# Patient Record
Sex: FEMALE | RURAL
[De-identification: names, ages, dates, MRNs, and addresses within clinical notes are randomized per-mention and may not be internally consistent; named-entity substitution may affect disease eponyms.]

---

## 2023-09-05 ENCOUNTER — APPOINTMENT (OUTPATIENT)
Dept: RURAL SURGERY 2 | Age: 48
Setting detail: DERMATOLOGY
End: 2023-09-05

## 2023-09-05 DIAGNOSIS — L73.2 HIDRADENITIS SUPPURATIVA: ICD-10-CM

## 2023-09-05 DIAGNOSIS — D17 BENIGN LIPOMATOUS NEOPLASM: ICD-10-CM

## 2023-09-05 DIAGNOSIS — L71.8 OTHER ROSACEA: ICD-10-CM

## 2023-09-05 PROBLEM — D17.23 BENIGN LIPOMATOUS NEOPLASM OF SKIN AND SUBCUTANEOUS TISSUE OF RIGHT LEG: Status: ACTIVE | Noted: 2023-09-05

## 2023-09-05 PROBLEM — D17.24 BENIGN LIPOMATOUS NEOPLASM OF SKIN AND SUBCUTANEOUS TISSUE OF LEFT LEG: Status: ACTIVE | Noted: 2023-09-05

## 2023-09-05 PROCEDURE — OTHER COUNSELING: OTHER

## 2023-09-05 PROCEDURE — 99204 OFFICE O/P NEW MOD 45 MIN: CPT

## 2023-09-05 PROCEDURE — OTHER PRESCRIPTION: OTHER

## 2023-09-05 RX ORDER — CLINDAMYCIN PHOSPHATE 10 MG/ML
LOTION TOPICAL QD
Qty: 60 | Refills: 3 | Status: ERX | COMMUNITY
Start: 2023-09-05

## 2023-09-05 RX ORDER — DOXYCYCLINE HYCLATE 50 MG/1
CAPSULE, GELATIN COATED ORAL
Qty: 30 | Refills: 0 | Status: ERX | COMMUNITY
Start: 2023-09-05

## 2023-09-05 RX ORDER — METRONIDAZOLE 10 MG/G
GEL TOPICAL QHS
Qty: 1 | Refills: 2 | Status: ERX | COMMUNITY
Start: 2023-09-05

## 2023-09-05 ASSESSMENT — LOCATION DETAILED DESCRIPTION DERM
LOCATION DETAILED: LEFT MEDIAL BUTTOCK
LOCATION DETAILED: LEFT SUPRAPUBIC SKIN
LOCATION DETAILED: MONS PUBIS
LOCATION DETAILED: LEFT INFERIOR CENTRAL MALAR CHEEK
LOCATION DETAILED: LEFT DISTAL PRETIBIAL REGION
LOCATION DETAILED: MONS PUBIS
LOCATION DETAILED: RIGHT DISTAL PRETIBIAL REGION
LOCATION DETAILED: LEFT CENTRAL MALAR CHEEK

## 2023-09-05 ASSESSMENT — LOCATION ZONE DERM
LOCATION ZONE: VULVA
LOCATION ZONE: LEG
LOCATION ZONE: VULVA
LOCATION ZONE: FACE
LOCATION ZONE: TRUNK

## 2023-09-05 ASSESSMENT — LOCATION SIMPLE DESCRIPTION DERM
LOCATION SIMPLE: RIGHT PRETIBIAL REGION
LOCATION SIMPLE: LEFT CHEEK
LOCATION SIMPLE: LEFT BUTTOCK
LOCATION SIMPLE: GROIN
LOCATION SIMPLE: GROIN
LOCATION SIMPLE: LEFT PRETIBIAL REGION

## 2023-09-05 NOTE — PROCEDURE: COUNSELING
Patient Specific Counseling (Will Not Stick From Patient To Patient): Discussed benign nature. Informed pt of scarring, and possible infection when removing. Pt elects to wait for removal
Detail Level: Detailed
Patient Specific Counseling (Will Not Stick From Patient To Patient): Discussed chronic nature in detail. Discussed Humira VS oral antibiotics VS topical therapy in detail for treatment options. Pt is to begin taking doxycycline 50mg take 1 tablet po every other day X 2 months. Recommended BPO wash QD. Begin Clindamycin lotion QD
Patient Specific Counseling (Will Not Stick From Patient To Patient): Discussed chronic nature. Inform pt of triggers- stress, alcohol, spicy food etc. Pt is to begin using Metrogel QD. Recommended BPO wash. Begin taking doxycycline 50mg take 1 tablet every other day X 2 months.